# Patient Record
Sex: MALE | Race: WHITE | URBAN - METROPOLITAN AREA
[De-identification: names, ages, dates, MRNs, and addresses within clinical notes are randomized per-mention and may not be internally consistent; named-entity substitution may affect disease eponyms.]

---

## 2019-08-27 NOTE — TELEPHONE ENCOUNTER
FUTURE VISIT INFORMATION      FUTURE VISIT INFORMATION:    Date: 8/29/19    Time: 2PM    [place] [facility] [place]:    Referring provider:      Referring providers clinic:      Reason for visit/diagnosis  chronic tonsillitis     RECORDS REQUESTED FROM:       Clinic name Comments Records Status Imaging Status                                         *no records

## 2019-08-29 ENCOUNTER — PRE VISIT (OUTPATIENT)
Dept: OTOLARYNGOLOGY | Facility: CLINIC | Age: 29
End: 2019-08-29

## 2019-08-29 ENCOUNTER — OFFICE VISIT (OUTPATIENT)
Dept: OTOLARYNGOLOGY | Facility: CLINIC | Age: 29
End: 2019-08-29

## 2019-08-29 VITALS
RESPIRATION RATE: 16 BRPM | HEIGHT: 74 IN | BODY MASS INDEX: 31.06 KG/M2 | DIASTOLIC BLOOD PRESSURE: 79 MMHG | WEIGHT: 242 LBS | HEART RATE: 60 BPM | SYSTOLIC BLOOD PRESSURE: 146 MMHG

## 2019-08-29 DIAGNOSIS — J35.1 ENLARGED TONSILS: Primary | ICD-10-CM

## 2019-08-29 DIAGNOSIS — J34.2 DEVIATED NASAL SEPTUM: ICD-10-CM

## 2019-08-29 ASSESSMENT — PAIN SCALES - GENERAL: PAINLEVEL: NO PAIN (0)

## 2019-08-29 ASSESSMENT — MIFFLIN-ST. JEOR: SCORE: 2142.45

## 2019-08-29 NOTE — PROGRESS NOTES
"[facility] Ear, Nose and Throat Clinic New Patient Visit Note        Otolaryngology        August 29, 2019    Referring Provider:  Self         HPI:  Eron Hui is a 29 year old male who presents for evaluation for a tonsillectomy.  He has a history of chronic tonsillitis.  Since he was a child, he had frequent episodes of tonsillitis and strep throat.  He thinks it happens 6-7 times a year.  In the past 6 months to a year, he has had more frequent episodes.  He also developed what his doctor told him was a left-sided peritonsillar abscess.  He was treated with steroids and antibiotics.  He never had it drained and never had surgery.    When he gets these episodes, he gets severe pain in his throat, fevers, exudates, lymphadenopathy and occasionally some referred otalgia.  He had the episodes of his peritonsillar abscess, he had trouble swallowing even liquids.    All symptoms have resolved at this time, however, his left tonsil remains quite enlarged.  He reports that he has been seen by Dr. Kimberlyn Dallas at [facility] and was set up to have his tonsils removed, however, he found out that he is unfortunately not in network and it was in a cost him nearly $7000 out of pocket.  He is here today because [facility] is within his network.  He wants to get scheduled with a surgeon to have his tonsillectomy done as soon as possible.  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    He is a former smoker.  He does drink alcohol.    ROS:  10 point review of systems completed and is negative except for those listed above    PMH: None, healthy    PSH: None    FamH: Mother and Father with HTN, \"all 4 grandparents with HTN\", \"a bunch of relatives on his mother side with diabetes\"    SocH:   Smoked 2-3 cigarettes per day for 8 years, quit 200 days ago (January 2019)  No smokeless tobacco or eCigs    EtOH:  Yes, drinks 8 drinks, 3 times a " "week    Illicit drugs:  No  Medications:   None      Allergies:   Amoxicillin, Penicillin, All \"cillin family\"      Physical Exam:   BP (!) 146/79 (BP Location: Right arm, Patient Position: Sitting, Cuff Size: Adult Large)   Pulse 60   Resp 16   Ht 1.88 m (6' 2\")   Wt 109.8 kg (242 lb)   BMI 31.07 kg/m      Constitutional: The patient was unaccompanied, well-groomed, and in no acute distress.    Head: Normocephalic and atraumatic.   Eyes: Pupils were equal and reactive.  Extraocular movement intact.    Ears: Pinnae and tragus non-tender.  EACs and TMs were clear under microscopic exam.   Nose: Sinuses were non-tender.  Anterior rhinoscopy revealed rightward deviation of septum and absence of purulence or polyps.    Mouth: Mucosa pink and moist, tonsils non-erythematous, no exudates, right tonsil 1+, left tonsil 3+ and cryptic, uvula midline  Neck: No lymphadenopathy in the neck.  No palpable thyroid.  Normal range of motion  Respiratory: Breathing comfortably without stridor or exertion of accessory muscles.   Skin: Normal:  warm and pink without rash  Neurologic: Alert and oriented x 3.  CN's III-XII within normal limits.  Voice normal.   Psychiatric: The patient's affect was calm, cooperative, and appropriate.    Communication: Normal; communicates verbally, normal voice quality.        Fiberoptic Endoscopy:  Consent for fiberoptic laryngoscopy was obtained, and we confirmed correctness of procedure and identity of patient.  Fiberoptic laryngoscopy was indicated due to hx of left PTA and enlarged left tonxil.  The nose was topically decongested and anesthetized.  The fiberoptic laryngoscope was passed under endoscopic vision.  The turbinates were normal.  The inferior and middle meati were clear bilaterally without purulence, masses, or polyps.  The nasopharynx was showed some enlarged adenoid tissue.  The Eustachian tubes were clear.  The soft palate appeared normal with good mobility.  The epiglottis was " sharp and the visualized portion of the vallecula was clear. Left lingual tonsil present and excess lymphoid tissue noted along the left pharyngeal wall. The larynx was clear with mobile cords.  The arytenoids were clear and there was no pooling in the hypopharynx.        Video of endoscopy (please click on image above to watch)           Left tonsil       Adenoid       Left lingual tonsill and L pharyngeal wall        Assessment/Plan:   1. Enlarged tonsil and adenoids.  Patient with chronic history of tonsillitis, enlarged left-sided tonsil, adenoids, enlarged left lingual tonsil and lymphoid tissue along the left pharyngeal wall.    Does meet criteria for tonsillectomy.  As mentioned above in the HPI portion of this note, he  is in a time comes to get this done.  I will have him see 1 of our surgeons as soon as possible to discuss a tonsillectomy.    2.  Deviated septum.  Asymptomatic.  Patient will contact us if he becomes symptomatic or if he decides he wants it fixed.        Luciana Washingtno PA-C  Otolaryngology  Head & Neck Surgery  [phone]      CC:  Tiffany Harris MD  Otolaryngology & Sleep Surgery  [facility] [address]    Nathalie Dallas MD  [facility] ENT  [address] [place] [address]

## 2019-08-29 NOTE — TELEPHONE ENCOUNTER
FUTURE VISIT INFORMATION      FUTURE VISIT INFORMATION:    Date: 8/30/19    Time: 1:00 pm    Location: [facility] [place] [facility]  REFERRAL INFORMATION:    Referring provider:  CECE Garcia    Referring providers clinic:   [facility]    Reason for visit/diagnosis  Tonsillectomy consult    RECORDS REQUESTED FROM:       Clinic name Comments Records Status Imaging Status   [facility] ENT Office Visit-8/29/19-CECE Garcia [facility] Otolaryngology Office Visit-7/29/19-Dr. Kimberlyn Dallas Care Everywhere     [facility]  Visit-7/23/19 Care Everywhere    -[facility] Office Visit-5/23/16 Care Everywhere

## 2019-08-29 NOTE — NURSING NOTE
"Chief Complaint   Patient presents with     Consult     chronic tonsillitis      BP (!) 146/79 (BP Location: Right arm, Patient Position: Sitting, Cuff Size: Adult Large)   Pulse 60   Resp 16   Ht 1.88 m (6' 2\")   Wt 109.8 kg (242 lb)   BMI 31.07 kg/m     Shawna Alvarado CMA    "

## 2019-08-29 NOTE — PATIENT INSTRUCTIONS
Eron Jacinto,    It was a pleasure to see you today.    1. You were seen in the ENT Clinic today by Luciana Washington PA-C    If you have any questions or concerns after your appointment, please call   - Option 1: ENT Clinic: [phone]    2. Please schedule with one of our surgeons for a tonsillectomy as soon as possible.      Thank you,  Luciana Washington PA-C  Otolaryngology  Head & Neck Surgery  [phone]

## 2019-08-29 NOTE — LETTER
8/29/2019       RE: Eron Jacinto  [address]  [place] [address]     Dear Colleague,    Thank you for referring your patient, Eron Jacinto, to the [facility] EAR NOSE AND THROAT at [facility]. Please see a copy of my visit note below.    [facility] Ear, Nose and Throat Clinic New Patient Visit Note        Otolaryngology        August 29, 2019    Referring Provider:  Self         HPI:  Eron Hui is a 29 year old male who presents for evaluation for a tonsillectomy.  He has a history of chronic tonsillitis.  Since he was a child, he had frequent episodes of tonsillitis and strep throat.  He thinks it happens 6-7 times a year.  In the past 6 months to a year, he has had more frequent episodes.  He also developed what his doctor told him was a left-sided peritonsillar abscess.  He was treated with steroids and antibiotics.  He never had it drained and never had surgery.    When he gets these episodes, he gets severe pain in his throat, fevers, exudates, lymphadenopathy and occasionally some referred otalgia.  He had the episodes of his peritonsillar abscess, he had trouble swallowing even liquids.    All symptoms have resolved at this time, however, his left tonsil remains quite enlarged.  He reports that he has been seen by Dr. Kimberlyn Dallas at [facility] and was set up to have his tonsils removed, however, he found out that he is unfortunately not in network and it was in a cost him nearly $7000 out of pocket.  He is here today because [facility] is within his network.  He wants to get scheduled with a surgeon to have his tonsillectomy done as soon as possible.  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    He is a former smoker.  He does drink alcohol.    ROS:  10 point review of systems completed and is negative except for those listed above    PMH: None, healthy    PSH:  "None    FamH: Mother and Father with HTN, \"all 4 grandparents with HTN\", \"a bunch of relatives on his mother side with diabetes\"    SocH:   Smoked 2-3 cigarettes per day for 8 years, quit 200 days ago (January 2019)  No smokeless tobacco or eCigs    EtOH:  Yes, drinks 8 drinks, 3 times a week    Illicit drugs:  No  Medications:   None      Allergies:   Amoxicillin, Penicillin, All \"cillin family\"      Physical Exam:   BP (!) 146/79 (BP Location: Right arm, Patient Position: Sitting, Cuff Size: Adult Large)   Pulse 60   Resp 16   Ht 1.88 m (6' 2\")   Wt 109.8 kg (242 lb)   BMI 31.07 kg/m       Constitutional: The patient was unaccompanied, well-groomed, and in no acute distress.    Head: Normocephalic and atraumatic.   Eyes: Pupils were equal and reactive.  Extraocular movement intact.    Ears: Pinnae and tragus non-tender.  EACs and TMs were clear under microscopic exam.   Nose: Sinuses were non-tender.  Anterior rhinoscopy revealed rightward deviation of septum and absence of purulence or polyps.    Mouth: Mucosa pink and moist, tonsils non-erythematous, no exudates, right tonsil 1+, left tonsil 3+ and cryptic, uvula midline  Neck: No lymphadenopathy in the neck.  No palpable thyroid.  Normal range of motion  Respiratory: Breathing comfortably without stridor or exertion of accessory muscles.   Skin: Normal:  warm and pink without rash  Neurologic: Alert and oriented x 3.  CN's III-XII within normal limits.  Voice normal.   Psychiatric: The patient's affect was calm, cooperative, and appropriate.    Communication: Normal; communicates verbally, normal voice quality.        Fiberoptic Endoscopy:  Consent for fiberoptic laryngoscopy was obtained, and we confirmed correctness of procedure and identity of patient.  Fiberoptic laryngoscopy was indicated due to hx of left PTA and enlarged left tonxil.  The nose was topically decongested and anesthetized.  The fiberoptic laryngoscope was passed under endoscopic " vision.  The turbinates were normal.  The inferior and middle meati were clear bilaterally without purulence, masses, or polyps.  The nasopharynx was showed some enlarged adenoid tissue.  The Eustachian tubes were clear.  The soft palate appeared normal with good mobility.  The epiglottis was sharp and the visualized portion of the vallecula was clear. Left lingual tonsil present and excess lymphoid tissue noted along the left pharyngeal wall. The larynx was clear with mobile cords.  The arytenoids were clear and there was no pooling in the hypopharynx.        Video of endoscopy (please click on image above to watch)           Left tonsil       Adenoid       Left lingual tonsill and L pharyngeal wall        Assessment/Plan:   1. Enlarged tonsil and adenoids.  Patient with chronic history of tonsillitis, enlarged left-sided tonsil, adenoids, enlarged left lingual tonsil and lymphoid tissue along the left pharyngeal wall.    Does meet criteria for tonsillectomy.  As mentioned above in the HPI portion of this note, he  is in a time comes to get this done.  I will have him see 1 of our surgeons as soon as possible to discuss a tonsillectomy.    2.  Deviated septum.  Asymptomatic.  Patient will contact us if he becomes symptomatic or if he decides he wants it fixed.        Luciana Washington PA-C  Otolaryngology  Head & Neck Surgery  [phone]      CC:  Tiffany Harris MD  Otolaryngology & Sleep Surgery  [facility] 396    Nathalie Dallas MD  [facility] ENT  [address] [place] [address]

## 2019-08-30 ENCOUNTER — TELEPHONE (OUTPATIENT)
Dept: OTOLARYNGOLOGY | Facility: CLINIC | Age: 29
End: 2019-08-30

## 2019-08-30 ENCOUNTER — OFFICE VISIT (OUTPATIENT)
Dept: OTOLARYNGOLOGY | Facility: CLINIC | Age: 29
End: 2019-08-30

## 2019-08-30 ENCOUNTER — PRE VISIT (OUTPATIENT)
Dept: OTOLARYNGOLOGY | Facility: CLINIC | Age: 29
End: 2019-08-30

## 2019-08-30 VITALS
RESPIRATION RATE: 15 BRPM | WEIGHT: 241 LBS | BODY MASS INDEX: 30.93 KG/M2 | DIASTOLIC BLOOD PRESSURE: 69 MMHG | HEART RATE: 72 BPM | HEIGHT: 74 IN | SYSTOLIC BLOOD PRESSURE: 124 MMHG

## 2019-08-30 DIAGNOSIS — J35.1 ENLARGED TONSILS: Primary | ICD-10-CM

## 2019-08-30 DIAGNOSIS — J35.01 CHRONIC TONSILLITIS: ICD-10-CM

## 2019-08-30 RX ORDER — CLINDAMYCIN PHOSPHATE 900 MG/50ML
900 INJECTION, SOLUTION INTRAVENOUS SEE ADMIN INSTRUCTIONS
Status: CANCELLED | OUTPATIENT
Start: 2019-08-30

## 2019-08-30 RX ORDER — CLINDAMYCIN PHOSPHATE 900 MG/50ML
900 INJECTION, SOLUTION INTRAVENOUS
Status: CANCELLED | OUTPATIENT
Start: 2019-08-30

## 2019-08-30 ASSESSMENT — MIFFLIN-ST. JEOR: SCORE: 2137.92

## 2019-08-30 ASSESSMENT — PAIN SCALES - GENERAL: PAINLEVEL: NO PAIN (0)

## 2019-08-30 NOTE — NURSING NOTE
"Chief Complaint   Patient presents with     RECHECK     Tonsillectomy consult     /69 (BP Location: Right arm, Patient Position: Sitting, Cuff Size: Adult Large)   Pulse 72   Resp 15   Ht 1.88 m (6' 2\")   Wt 109.3 kg (241 lb)   BMI 30.94 kg/m      Shawna Alvarado CMA    "

## 2019-08-30 NOTE — TELEPHONE ENCOUNTER
Phone call to pt with 9/9 11AM surgery time.  Pt states he has preop physical scheduled.  Preop instruction provided at [facility] today.  Asked pt to call back with questions or concerns.  Ana Og RN

## 2019-08-30 NOTE — PATIENT INSTRUCTIONS
1.  You were seen in the ENT Clinic today by Dr. Harris.  If you have any questions or concerns after your appointment, please call [phone].    2.  Plan is to move forward with a Bilateral Tonsillectomy. This is an outpatient procedure that is done in the operating room. You will need a  the day of surgery.    3. You will need to consult with your primary care MD for a pre-op physical.  Forms for this were sent home with you today.    4. Didi, our surgery scheduler will call you with a date and time for the procedure. You can also reach her at [phone]     5.  Please return to the clinic 3 weeks after your surgery       Please call our clinic for any questions, concerns, and/or worsening symptoms.      Clinic #[phone]       Option 1 for scheduling.    Thank you for allowing us to be apart of your care!    Lorraine DONOVAN RNCC    If you need to reach me my direct line is: [phone]         Patient Education     Adult Tonsillectomy    The tonsils are 2 small masses of tissue at the back of the throat. They are part of the body s immune system. This helps the body fight disease. In some people, the tonsils become infected or enlarged. This can cause severe sore throats, snoring, or other problems. Tonsillectomy is surgery to remove the tonsils. Tonsillectomy may be recommended if you have obstruction causing sleep apnea, or recurring, chronic, or severe infections.   Preparing for surgery  Prepare as you have been told. Tell your healthcare provider about all medicine you take. This includes over-the-counter drugs. It also includes herbs and other supplements. You may need to stop taking some or all of them before surgery as directed by your healthcare provider. Also, follow any directions you re given for not eating or drinking before surgery.  The day of surgery  The surgery takes about 60 minutes. You will likely go home on the same day.  Before the surgery  Here is what to expect before the  surgery begins:    An IV line is put into a vein in your arm or hand. This line supplies fluids and medicines.    To keep you free of pain during the surgery, you re given general anesthesia. This medicine puts you into a state like deep sleep through the surgery.  During the surgery  Here is what to expect during the surgery:    A tube will be placed in your throat to keep your airway open.    A special device is used to keep the mouth open.    Other tools are used to remove the tonsils or part of the tonsils  from the back of the throat. The tissue is taken out through the mouth.    The device holding the mouth open and the tube are then removed.  After the surgery  You will be taken to a recovery room. Healthcare staff will make sure you can drink some liquids. They will also make sure your pain is being managed. When you are ready to leave the hospital, you will need to be driven home by an adult family member or friend.  Recovering at home  It will likely take about 2 weeks to heal from the surgery. During your recovery:    Expect to have throat pain. You may also feel pain in your ears. This is  referred  pain from the throat, and is normal. Your post-surgery pain may come and go. It may be worse on the 1st or 2nd day after surgery.    Talk as little as possible, if it is painful.    Take pain medicine as directed.    Don't drive while you are on opioid or narcotic pain medicine. Expect to feel sleepy or dizzy while you are taking this medicine.    Don't use ibuprofen or aspirin for 14 days after surgery unless your healthcare provider says it s OK. You may use acetaminophen as directed.    Use 2 or 3 pillows under your head while resting. This will help keep swelling down.    Drink plenty of chilled liquids. Water, noncitrus juices, and frozen juice bars are good choices.    Eat cold foods and soft foods, which are easiest to swallow. Try foods like ice cream, gelatin, scrambled eggs, pasta, and mashed  potatoes.    Don't eat foods that need a lot of chewing. Also avoid foods that may scratch the throat, like toast or potato chips. Don't have hot, spicy, or acidic foods.    Don't do strenuous activity or heavy lifting for 2 weeks after surgery.    Be aware that white patches will form in the throat during healing. These are scabs and are not a sign of infection. The patches will come off in  6 to 9 days and may cause a small amount of  bleeding. To minimize bleeding, drink plenty of fluids. Gargling with cold water can help.  When to call your healthcare provider  Call your healthcare provider right away if you have any of the following:    Chest pain or trouble breathing (call 911)    Fever of 100.4 F (38 C) or higher, or as directed by your healthcare provider    Bright red bleeding from the mouth or nose    Severe pain not relieved by medicine    Signs of dehydration (dark urine, urinating less often)    Heavy or persistent bleeding in the throat at any time    Other signs or symptoms as indicated by your healthcare provider   Follow-up  Schedule a follow-up visit with your healthcare provider as advised. During this visit, the healthcare provider will make sure you are healing well. Ask any questions you have about the surgery or your recovery.  Risks and possible complications    Infection    Bleeding    Lung problems    Nausea and vomiting    Injury to the lips, teeth, or jaw    Painful swallowing during recovery    Voice changes    The need for a second surgery    Risks of anesthesia    Date Last Reviewed: 6/1/2017 2000-2018 [employer]. [address] [place] [address]. All rights reserved. This information is not intended as a substitute for professional medical care. Always follow your healthcare professional's instructions.           Patient Education     Tonsillectomy, Post-Op Bleeding  You have had surgery to remove your tonsils. Bleeding at the surgery site can happen after  surgery. The healthcare provider can often control it using direct pressure or applying medicine to shrink the blood vessels. If this fails, you will need to return to the operating room for further treatment. Notify your healthcare provider at once or return to this hospital if there are signs of any further bleeding.  Home Care    Your throat will be sore. Throat pain after surgery improves over the first 3-5 days. It is normal to have more pain at the end of the first week before it finally goes away.    Soft foods will be easier to swallow.    Drink plenty of fluids to prevent dehydration. You must continue to drink even though your throat hurts.    For pain relief, suck on ice cubes or frozen fruit pops, eat ice cream or sherbet, and drink cold liquids. You may also use liquid acetaminophen. Don't take ibuprofen or aspirin. These may increase bleeding risk. If your healthcare provider has prescribed pain medicine, take this as directed.     If antibiotics were prescribed, take these as directed until they are gone.    Do not overheat your home since this dries the air and may irritate throat tissues, especially at night. A cool-mist humidifier in the bedroom may help.    Don t have contact with people with colds or the flu during the recovery period. You may be more likely to get ill during this time.    Smoking irritates the surgery site. If you smoke, this is a good time to stop.    Don't do heavy exercise, lifting, or straining for the next 10 days.  Follow-up care  Follow up with your healthcare provider or this facility as advised.  When to see medical advice  Call your healthcare provider right away if any of the following occur:    Trouble speaking, swallowing, or breathing    Nausea and vomiting    Bleeding from the mouth    Fever over 100.4 F (38.3 C)    Increasing pain not controlled by pain medicines    Signs of dehydration: Very dark urine, less urine, dry mouth, weakness  Date Last Reviewed:  10/1/2017    2017-4808 [employer]. [address] [place] [address], [place] [address]. All rights reserved. This information is not intended as a substitute for professional medical care. Always follow your healthcare professional's instructions.           Caring for Yourself after Tonsillectomy / Adenoidectomy     What to expect after surgery:     A low fever (below 101 F or 38.3 C, taken under the tongue).     A sore throat that lasts 7 to 10 days, or as long as 14 days.     Ear, jaw or neck pain. Pain may peak about a week after surgery.     Yellow or white-gray tissue where the tonsils were removed.     A white film on the tongue. This will go away within 10 to 14 days.     Bad breath for many days as the throat heals. Gentle tooth brushing is allowed. Do not have gargle.    A change in the voice. This will go away in about three weeks.     Snoring: This will usually improve over time.     Stuffy nose: This is normal.   Care after surgery:     Consume a mechanical soft diet for the first week after surgery. Progress to thicker foods as tolerated.  o Macaroni, eggs, mashed potatoes, applesauce, cooked cereal, yogurt, etc.    Avoid rough or crunchy foods for at least 7 days.     Consume plenty of fluids- at least 24 to 64 ounces per day. Cool or lukewarm liquids may feel better at first. Sports drinks are a good choice. Avoid citrus juice as this may burn.     Popsicles, smoothies, and ICEES are good options to sooth the throat.   o NO STRAWS    Chewing gum may help increase saliva and ease pain.   Things to Avoid:     Gargling     Avoid rough or crunchy foods for at least 7 days.     Straws    Heavy or strenuous activity for at least 7 days.   Activity:     You should avoid heavy or strenuous activity for one week.     Refrain from work for at least 2 weeks following your surgery. You may not return if you are still taking prescribed narcotic pain medicine.  Pain:     Pain may start to get better and then  get worse again, often peaking on days 3 to 7 after surgery. This is common.     It will hurt to swallow at first. The more you attempt to swallow, the less it will hurt.     You may take prescribed pain medicine as needed. We will tell you how much to take and how often. Expect to take pain medications for at least 7-10 days     After two days, you may replace some or all of the prescribed medicine with liquid Tylenol.     Talk to your doctor before giving ibuprofen (Motrin, Advil) or other potential blood thinning medicines within 10 days following surgery. Some medicines will increase the risk of bleeding.     A humidifier may help ease a sore throat. You might also try an ice pack on the throat for 20 minutes. (Place a cloth between the skin and the ice pack.)     Follow up:     3 weeks after surgery    When to call us:     Bleeding: if you have any bleeding, call your clinic right away.   o Some bleeding after surgery is normal. Expect blood tinged mucous.   o The risk for bleeding increases approximately 10 days after surgery when the surgery site eschar- gray patches where tonsils were removed ( like a wet scab) begins to fall off.   o If it is after business hours, go to the Emergency Room. Bleeding may occur up to 2 weeks after surgery. Most people will spit out the blood. Some will swallow the blood and then vomit.     Fever over 101 F (38.3 C), taken under the tongue, if the fever lasts more than 48 hours.     Nausea, vomiting or constipation    Breathing problems (more severe than a stuffy nose) go to the ER     Important Phone Numbers:     During office hours: [phone]     Emergency/ After hours: [phone] (ask to page the ENT resident who is on-call)   [facility]/ [facility] Emergency room, or closest emergency room for bleeding, airway concerns.     What to bring to ER- yourself, family member, insurance cards.    Please call/contact with further questions/concerns.

## 2019-08-30 NOTE — LETTER
2019       RE: Eron Jacinto  [address]  [place] [address]     Dear Colleague,    Thank you for referring your patient, Eron Jacinto, to the [facility] EAR NOSE AND THROAT at [facility]. Please see a copy of my visit note below.    Otolaryngology Adult Consultation    Patient: Eron Jacinto  : 1990        HPI:  Eron Jacinto is a 29 year old male seen today in the Otolaryngology Clinic for recurrent tonsillitis.  He has had a long history recurring tonsillitis.  He was previously seen at [facility] for the same issue.  He is not in network with them and therefore is switching his care over to the [facility].  He  he recently had a bad tonsillitis episode in which the left side of his throat and tonsil started swelling up.  Left tonsil got as twice as large as the right side.  It sounds like he had a potential abscess there.  He was placed on antibiotics and steroids which did resolve the episode eventually.  Patient is very tired of recurrent tonsillitis because it does interfere with  work and time off.  He gets this episode 6-7 times a year.  In the past 6 months to year he has had 4 more frequent episodes.  When he gets these episodes he gets severe pain, fever, exudates, lymphadenopathy and referred otalgia.  He has odynophagia.    Medications:  No current outpatient medications on file.       Allergies: Amoxicillin and Penicillins     PMH:  No past medical history on file.    PSH:  No past surgical history on file.    FH:  No family history on file.     SH:  Social History     Tobacco Use     Smoking status: Not on file   Substance Use Topics     Alcohol use: Not on file     Drug use: Not on file       Review of Systems   ENT ROS 2019   Constitutional Problems with sleep, Unexplained fever or night sweats   Ears, Nose, Throat Ear pain, Sore throat, Trouble swallowing, Coughing up blood   Hematologic Lymph node swelling    Endocrine Thirst, Heat or cold intolerance   Other Rash       Physical Exam:    GEN:  The patient is alert, oriented and in no acute distress.  HEAD:  Head, face scalp is grossly normal.  .  ORAL:  Oral cavity shows healthy mucosa with out ulceration, masses or other lesions                involving the tongue, palate, buccal mucosa, floor of mouth or gingiva.                 NECK:   Neck is without adenopathy, thyroid or salivary gland masses.      Assessment/Plan: Patient presents with a history of recurrent tonsillitis.Risks of surgery were discussed with the patient ,which include sever pain, bleeding 7-10 days after surgery (~8% risk), tongue numbness, tongue swelling, taste change, VPI, and nasopharyngeal stenosis. Expected post-operative recovery was also discussed and includes soft diet, pain control, and time off of work (7-14 days).    Patient would prefer to have surgery on Monday, September 9.  I did tell him that is not my typical operative day here at the [facility].  I do go up to [place] on Mondays.  It is possible we could see if there is OR time there.     I spent a total of 35 minutes face-to-face with Eron Jacinto during today's office visit.  Over 50% of this time was spent counseling the patient on and/or coordinating care as documented in my assessment and plan.        Again, thank you for allowing me to participate in the care of your patient.      Sincerely,    Tiffany Harris MD

## 2019-08-30 NOTE — PROGRESS NOTES
Otolaryngology Adult Consultation    Patient: Eron Jacinto  : 1990        HPI:  Eron Jacinto is a 29 year old male seen today in the Otolaryngology Clinic for recurrent tonsillitis.  He has had a long history recurring tonsillitis.  He was previously seen at health [facility] for the same issue.  He is not in network with them and therefore is switching his care over to the [facility].  He  he recently had a bad tonsillitis episode in which the left side of his throat and tonsil started swelling up.  Left tonsil got as twice as large as the right side.  It sounds like he had a potential abscess there.  He was placed on antibiotics and steroids which did resolve the episode eventually.  Patient is very tired of recurrent tonsillitis because it does interfere with  work and time off.  He gets this episode 6-7 times a year.  In the past 6 months to year he has had 4 more frequent episodes.  When he gets these episodes he gets severe pain, fever, exudates, lymphadenopathy and referred otalgia.  He has odynophagia.    Medications:  No current outpatient medications on file.       Allergies: Amoxicillin and Penicillins     PMH:  No past medical history on file.    PSH:  No past surgical history on file.    FH:  No family history on file.     SH:  Social History     Tobacco Use     Smoking status: Not on file   Substance Use Topics     Alcohol use: Not on file     Drug use: Not on file       Review of Systems   ENT ROS 2019   Constitutional Problems with sleep, Unexplained fever or night sweats   Ears, Nose, Throat Ear pain, Sore throat, Trouble swallowing, Coughing up blood   Hematologic Lymph node swelling   Endocrine Thirst, Heat or cold intolerance   Other Rash       Physical Exam:    GEN:  The patient is alert, oriented and in no acute distress.  HEAD:  Head, face scalp is grossly normal.  .  ORAL:  Oral cavity shows healthy mucosa with out ulceration, masses or other lesions                 involving the tongue, palate, buccal mucosa, floor of mouth or gingiva.                 NECK:   Neck is without adenopathy, thyroid or salivary gland masses.      Assessment/Plan: Patient presents with a history of recurrent tonsillitis.Risks of surgery were discussed with the patient ,which include sever pain, bleeding 7-10 days after surgery (~8% risk), tongue numbness, tongue swelling, taste change, VPI, and nasopharyngeal stenosis. Expected post-operative recovery was also discussed and includes soft diet, pain control, and time off of work (7-14 days).    Patient would prefer to have surgery on Monday, September 9.  I did tell him that is not my typical operative day here at the [facility].  I do go up to [place] on Mondays.  It is possible we could see if there is OR time there.     I spent a total of 35 minutes face-to-face with Eron Jacinto during today's office visit.  Over 50% of this time was spent counseling the patient on and/or coordinating care as documented in my assessment and plan.

## 2019-09-06 ENCOUNTER — ANESTHESIA EVENT (OUTPATIENT)
Dept: SURGERY | Facility: AMBULATORY SURGERY CENTER | Age: 29
End: 2019-09-06

## 2019-09-09 ENCOUNTER — ANESTHESIA (OUTPATIENT)
Dept: SURGERY | Facility: AMBULATORY SURGERY CENTER | Age: 29
End: 2019-09-09
Payer: COMMERCIAL

## 2019-09-09 ENCOUNTER — HOSPITAL ENCOUNTER (OUTPATIENT)
Facility: AMBULATORY SURGERY CENTER | Age: 29
Discharge: HOME | End: 2019-09-09
Attending: OTOLARYNGOLOGY | Admitting: OTOLARYNGOLOGY
Payer: COMMERCIAL

## 2019-09-09 VITALS
TEMPERATURE: 97 F | SYSTOLIC BLOOD PRESSURE: 131 MMHG | HEART RATE: 56 BPM | RESPIRATION RATE: 14 BRPM | DIASTOLIC BLOOD PRESSURE: 72 MMHG | OXYGEN SATURATION: 100 %

## 2019-09-09 DIAGNOSIS — J35.01 CHRONIC TONSILLITIS: ICD-10-CM

## 2019-09-09 DIAGNOSIS — G89.18 ACUTE POST-OPERATIVE PAIN: Primary | ICD-10-CM

## 2019-09-09 PROCEDURE — 42826 REMOVAL OF TONSILS: CPT | Performed by: OTOLARYNGOLOGY

## 2019-09-09 PROCEDURE — G8907 PT DOC NO EVENTS ON DISCHARG: HCPCS

## 2019-09-09 PROCEDURE — G8916 PT W IV AB GIVEN ON TIME: HCPCS

## 2019-09-09 PROCEDURE — 42826 REMOVAL OF TONSILS: CPT

## 2019-09-09 PROCEDURE — 88304 TISSUE EXAM BY PATHOLOGIST: CPT | Performed by: OTOLARYNGOLOGY

## 2019-09-09 RX ORDER — LIDOCAINE HYDROCHLORIDE 20 MG/ML
INJECTION, SOLUTION INFILTRATION; PERINEURAL PRN
Status: DISCONTINUED | OUTPATIENT
Start: 2019-09-09 | End: 2019-09-09

## 2019-09-09 RX ORDER — CLINDAMYCIN PHOSPHATE 900 MG/50ML
900 INJECTION, SOLUTION INTRAVENOUS SEE ADMIN INSTRUCTIONS
Status: DISCONTINUED | OUTPATIENT
Start: 2019-09-09 | End: 2019-09-10 | Stop reason: HOSPADM

## 2019-09-09 RX ORDER — GABAPENTIN 300 MG/1
300 CAPSULE ORAL ONCE
Status: COMPLETED | OUTPATIENT
Start: 2019-09-09 | End: 2019-09-09

## 2019-09-09 RX ORDER — HYDROCODONE BITARTRATE AND ACETAMINOPHEN 7.5; 325 MG/15ML; MG/15ML
10 SOLUTION ORAL
Status: DISCONTINUED | OUTPATIENT
Start: 2019-09-09 | End: 2019-09-10 | Stop reason: HOSPADM

## 2019-09-09 RX ORDER — FENTANYL CITRATE 50 UG/ML
25-50 INJECTION, SOLUTION INTRAMUSCULAR; INTRAVENOUS
Status: DISCONTINUED | OUTPATIENT
Start: 2019-09-09 | End: 2019-09-10 | Stop reason: HOSPADM

## 2019-09-09 RX ORDER — LIDOCAINE HYDROCHLORIDE AND EPINEPHRINE 10; 10 MG/ML; UG/ML
INJECTION, SOLUTION INFILTRATION; PERINEURAL PRN
Status: DISCONTINUED | OUTPATIENT
Start: 2019-09-09 | End: 2019-09-09 | Stop reason: HOSPADM

## 2019-09-09 RX ORDER — DEXAMETHASONE SODIUM PHOSPHATE 4 MG/ML
4 INJECTION, SOLUTION INTRA-ARTICULAR; INTRALESIONAL; INTRAMUSCULAR; INTRAVENOUS; SOFT TISSUE EVERY 10 MIN PRN
Status: DISCONTINUED | OUTPATIENT
Start: 2019-09-09 | End: 2019-09-10 | Stop reason: HOSPADM

## 2019-09-09 RX ORDER — ONDANSETRON 2 MG/ML
INJECTION INTRAMUSCULAR; INTRAVENOUS PRN
Status: DISCONTINUED | OUTPATIENT
Start: 2019-09-09 | End: 2019-09-09

## 2019-09-09 RX ORDER — ONDANSETRON 2 MG/ML
4 INJECTION INTRAMUSCULAR; INTRAVENOUS EVERY 30 MIN PRN
Status: DISCONTINUED | OUTPATIENT
Start: 2019-09-09 | End: 2019-09-10 | Stop reason: HOSPADM

## 2019-09-09 RX ORDER — PREDNISONE 20 MG/1
20 TABLET ORAL DAILY
Qty: 5 TABLET | Refills: 0 | Status: SHIPPED | OUTPATIENT
Start: 2019-09-09

## 2019-09-09 RX ORDER — DEXAMETHASONE SODIUM PHOSPHATE 4 MG/ML
INJECTION, SOLUTION INTRA-ARTICULAR; INTRALESIONAL; INTRAMUSCULAR; INTRAVENOUS; SOFT TISSUE PRN
Status: DISCONTINUED | OUTPATIENT
Start: 2019-09-09 | End: 2019-09-09

## 2019-09-09 RX ORDER — SODIUM CHLORIDE, SODIUM LACTATE, POTASSIUM CHLORIDE, CALCIUM CHLORIDE 600; 310; 30; 20 MG/100ML; MG/100ML; MG/100ML; MG/100ML
INJECTION, SOLUTION INTRAVENOUS CONTINUOUS
Status: DISCONTINUED | OUTPATIENT
Start: 2019-09-09 | End: 2019-09-10 | Stop reason: HOSPADM

## 2019-09-09 RX ORDER — NALOXONE HYDROCHLORIDE 0.4 MG/ML
.1-.4 INJECTION, SOLUTION INTRAMUSCULAR; INTRAVENOUS; SUBCUTANEOUS
Status: DISCONTINUED | OUTPATIENT
Start: 2019-09-09 | End: 2019-09-10 | Stop reason: HOSPADM

## 2019-09-09 RX ORDER — MEPERIDINE HYDROCHLORIDE 25 MG/ML
12.5 INJECTION INTRAMUSCULAR; INTRAVENOUS; SUBCUTANEOUS
Status: DISCONTINUED | OUTPATIENT
Start: 2019-09-09 | End: 2019-09-10 | Stop reason: HOSPADM

## 2019-09-09 RX ORDER — ACETAMINOPHEN 325 MG/1
975 TABLET ORAL ONCE
Status: COMPLETED | OUTPATIENT
Start: 2019-09-09 | End: 2019-09-09

## 2019-09-09 RX ORDER — CLINDAMYCIN PHOSPHATE 900 MG/50ML
900 INJECTION, SOLUTION INTRAVENOUS
Status: COMPLETED | OUTPATIENT
Start: 2019-09-09 | End: 2019-09-09

## 2019-09-09 RX ORDER — ONDANSETRON 4 MG/1
4 TABLET, ORALLY DISINTEGRATING ORAL EVERY 30 MIN PRN
Status: DISCONTINUED | OUTPATIENT
Start: 2019-09-09 | End: 2019-09-10 | Stop reason: HOSPADM

## 2019-09-09 RX ORDER — ONDANSETRON 4 MG/1
4-8 TABLET, ORALLY DISINTEGRATING ORAL EVERY 8 HOURS PRN
Qty: 4 TABLET | Refills: 0 | Status: SHIPPED | OUTPATIENT
Start: 2019-09-09

## 2019-09-09 RX ORDER — LIDOCAINE 40 MG/G
CREAM TOPICAL
Status: DISCONTINUED | OUTPATIENT
Start: 2019-09-09 | End: 2019-09-10 | Stop reason: HOSPADM

## 2019-09-09 RX ORDER — OXYCODONE HYDROCHLORIDE 5 MG/1
5-10 TABLET ORAL EVERY 4 HOURS PRN
Status: DISCONTINUED | OUTPATIENT
Start: 2019-09-09 | End: 2019-09-10 | Stop reason: HOSPADM

## 2019-09-09 RX ORDER — AMOXICILLIN 250 MG
1-2 CAPSULE ORAL 2 TIMES DAILY
Qty: 30 TABLET | Refills: 0 | Status: SHIPPED | OUTPATIENT
Start: 2019-09-09

## 2019-09-09 RX ORDER — FENTANYL CITRATE 50 UG/ML
INJECTION, SOLUTION INTRAMUSCULAR; INTRAVENOUS PRN
Status: DISCONTINUED | OUTPATIENT
Start: 2019-09-09 | End: 2019-09-09

## 2019-09-09 RX ORDER — HYDROMORPHONE HYDROCHLORIDE 1 MG/ML
.3-.5 INJECTION, SOLUTION INTRAMUSCULAR; INTRAVENOUS; SUBCUTANEOUS EVERY 10 MIN PRN
Status: DISCONTINUED | OUTPATIENT
Start: 2019-09-09 | End: 2019-09-10 | Stop reason: HOSPADM

## 2019-09-09 RX ORDER — PROPOFOL 10 MG/ML
INJECTION, EMULSION INTRAVENOUS PRN
Status: DISCONTINUED | OUTPATIENT
Start: 2019-09-09 | End: 2019-09-09

## 2019-09-09 RX ORDER — ALBUTEROL SULFATE 0.83 MG/ML
2.5 SOLUTION RESPIRATORY (INHALATION) EVERY 4 HOURS PRN
Status: DISCONTINUED | OUTPATIENT
Start: 2019-09-09 | End: 2019-09-10 | Stop reason: HOSPADM

## 2019-09-09 RX ORDER — KETOROLAC TROMETHAMINE 30 MG/ML
30 INJECTION, SOLUTION INTRAMUSCULAR; INTRAVENOUS EVERY 6 HOURS PRN
Status: DISCONTINUED | OUTPATIENT
Start: 2019-09-09 | End: 2019-09-10 | Stop reason: HOSPADM

## 2019-09-09 RX ORDER — OXYCODONE HCL 5 MG/5 ML
5 SOLUTION, ORAL ORAL EVERY 4 HOURS PRN
Qty: 210 ML | Refills: 0 | Status: SHIPPED | OUTPATIENT
Start: 2019-09-09 | End: 2019-09-11

## 2019-09-09 RX ORDER — GLYCOPYRROLATE 0.2 MG/ML
INJECTION, SOLUTION INTRAMUSCULAR; INTRAVENOUS PRN
Status: DISCONTINUED | OUTPATIENT
Start: 2019-09-09 | End: 2019-09-09

## 2019-09-09 RX ADMIN — SODIUM CHLORIDE, SODIUM LACTATE, POTASSIUM CHLORIDE, CALCIUM CHLORIDE: 600; 310; 30; 20 INJECTION, SOLUTION INTRAVENOUS at 10:35

## 2019-09-09 RX ADMIN — GLYCOPYRROLATE 0.2 MG: 0.2 INJECTION, SOLUTION INTRAMUSCULAR; INTRAVENOUS at 11:01

## 2019-09-09 RX ADMIN — FENTANYL CITRATE 25 MCG: 50 INJECTION, SOLUTION INTRAMUSCULAR; INTRAVENOUS at 12:10

## 2019-09-09 RX ADMIN — FENTANYL CITRATE 50 MCG: 50 INJECTION, SOLUTION INTRAMUSCULAR; INTRAVENOUS at 11:04

## 2019-09-09 RX ADMIN — LIDOCAINE HYDROCHLORIDE 5 ML: 20 INJECTION, SOLUTION INFILTRATION; PERINEURAL at 11:07

## 2019-09-09 RX ADMIN — Medication 30 MG: at 11:08

## 2019-09-09 RX ADMIN — DEXAMETHASONE SODIUM PHOSPHATE 4 MG: 4 INJECTION, SOLUTION INTRA-ARTICULAR; INTRALESIONAL; INTRAMUSCULAR; INTRAVENOUS; SOFT TISSUE at 11:15

## 2019-09-09 RX ADMIN — FENTANYL CITRATE 25 MCG: 50 INJECTION, SOLUTION INTRAMUSCULAR; INTRAVENOUS at 12:07

## 2019-09-09 RX ADMIN — FENTANYL CITRATE 50 MCG: 50 INJECTION, SOLUTION INTRAMUSCULAR; INTRAVENOUS at 11:07

## 2019-09-09 RX ADMIN — ONDANSETRON 4 MG: 2 INJECTION INTRAMUSCULAR; INTRAVENOUS at 11:15

## 2019-09-09 RX ADMIN — OXYCODONE HYDROCHLORIDE 5 MG: 5 TABLET ORAL at 12:16

## 2019-09-09 RX ADMIN — ACETAMINOPHEN 975 MG: 325 TABLET ORAL at 10:24

## 2019-09-09 RX ADMIN — FENTANYL CITRATE 25 MCG: 50 INJECTION, SOLUTION INTRAMUSCULAR; INTRAVENOUS at 12:20

## 2019-09-09 RX ADMIN — CLINDAMYCIN PHOSPHATE 900 MG: 900 INJECTION, SOLUTION INTRAVENOUS at 11:01

## 2019-09-09 RX ADMIN — GABAPENTIN 300 MG: 300 CAPSULE ORAL at 10:24

## 2019-09-09 RX ADMIN — PROPOFOL 270 MG: 10 INJECTION, EMULSION INTRAVENOUS at 11:07

## 2019-09-09 RX ADMIN — SODIUM CHLORIDE, SODIUM LACTATE, POTASSIUM CHLORIDE, CALCIUM CHLORIDE: 600; 310; 30; 20 INJECTION, SOLUTION INTRAVENOUS at 11:01

## 2019-09-09 NOTE — ANESTHESIA PREPROCEDURE EVALUATION
Anesthesia Pre-Procedure Evaluation    Patient: Eron Jacinto   MRN:     [id number] Gender:   male   Age:    29 year old :      1990        Preoperative Diagnosis: Chronic tonsillitis   Procedure(s):  TONSILLECTOMY     No past medical history on file.   No past surgical history on file.       Anesthesia Evaluation     .             ROS/MED HX    ENT/Pulmonary: Comment: Chronic Tonsillitis and recurrent strep presenting for tonsillectomy      Neurologic:  - neg neurologic ROS     Cardiovascular:  - neg cardiovascular ROS       METS/Exercise Tolerance:  >4 METS   Hematologic:  - neg hematologic  ROS       Musculoskeletal:  - neg musculoskeletal ROS       GI/Hepatic:  - neg GI/hepatic ROS       Renal/Genitourinary:  - ROS Renal section negative       Endo:  - neg endo ROS       Psychiatric:  - neg psychiatric ROS       Infectious Disease:  - neg infectious disease ROS       Malignancy:         Other:                         PHYSICAL EXAM:   Mental Status/Neuro: A/A/O   Airway: Facies: Feasible  Mallampati: I  Mouth/Opening: Full  TM distance: > 6 cm  Neck ROM: Full   Respiratory: Auscultation: CTAB     Resp. Rate: Normal     Resp. Effort: Normal      CV: Rhythm: Regular  Rate: Age appropriate  Heart: Normal Sounds  Edema: None   Comments:      Dental: Normal Dentition                LABS:  CBC: No results found for: WBC, HGB, HCT, PLT  BMP: No results found for: NA, POTASSIUM, CHLORIDE, CO2, BUN, CR, GLC  COAGS: No results found for: PTT, INR, FIBR  POC: No results found for: BGM, HCG, HCGS  OTHER: No results found for: PH, LACT, A1C, SUDHA, PHOS, MAG, ALBUMIN, PROTTOTAL, ALT, AST, GGT, ALKPHOS, BILITOTAL, BILIDIRECT, LIPASE, AMYLASE, MARK, TSH, T4, T3, CRP, SED     Preop Vitals    BP Readings from Last 3 Encounters:   19 124/69   19 (!) 146/79    Pulse Readings from Last 3 Encounters:   19 72   19 60      Resp Readings from Last 3 Encounters:   19 15   19 16    SpO2  "Readings from Last 3 Encounters:   No data found for SpO2      Temp Readings from Last 1 Encounters:   No data found for Temp    Ht Readings from Last 1 Encounters:   08/30/19 1.88 m (6' 2\")      Wt Readings from Last 1 Encounters:   08/30/19 109.3 kg (241 lb)    Estimated body mass index is 30.94 kg/m  as calculated from the following:    Height as of 8/30/19: 1.88 m (6' 2\").    Weight as of 8/30/19: 109.3 kg (241 lb).     LDA:        Assessment:   ASA SCORE: 1    H&P: History and physical reviewed and following examination; no interval change.         Plan:   Anes. Type:  General   Pre-Medication: None   Induction:  IV (Standard)   Airway: ETT; Oral; PRABHJOT   Access/Monitoring: PIV   Maintenance: Balanced     Postop Plan:   Postop Pain: Opioids  Postop Sedation/Airway: Not planned  Disposition: Outpatient     PONV Management:   Adult Risk Factors:, Postop Opioids   Prevention: Ondansetron, Dexamethasone     CONSENT: Direct conversation   Plan and risks discussed with: Patient   Blood Products: Consented (ALL Blood Products)                   Mamadou Ray MD  "

## 2019-09-09 NOTE — DISCHARGE INSTRUCTIONS
[facility]  Same-Day Surgery   Adult Discharge Orders & Instructions   For 24 hours after surgery  1. Get plenty of rest.  A responsible adult must stay with you for at least 24 hours after you leave the hospital.   2. Do not drive or use heavy equipment.  If you have weakness or tingling, don't drive or use heavy equipment until this feeling goes away.  3. Do not drink alcohol.  4. Avoid strenuous or risky activities.  Ask for help when climbing stairs.   5. You may feel lightheaded.  IF so, sit for a few minutes before standing.  Have someone help you get up.   6. If you have nausea (feel sick to your stomach): Drink only clear liquids such as apple juice, ginger ale, broth or 7-Up.  Rest may also help.  Be sure to drink enough fluids.  Move to a regular diet as you feel able.  7. You may have a slight fever. Call the doctor if your fever is over 100 F (37.7 C) (taken under the tongue) or lasts longer than 24 hours.  8. You may have a dry mouth, a sore throat, muscle aches or trouble sleeping.  These should go away after 24 hours.  9. Do not make important or legal decisions.   Call your doctor for any of the followin.  Signs of infection (fever, growing tenderness at the surgery site, a large amount of drainage or bleeding, severe pain, foul-smelling drainage, redness, swelling).    2. It has been over 8 to 10 hours since surgery and you are still not able to urinate (pass water).    3.  Headache for over 24 hours.    Tonsillectomy and Adenoidectomy    What is a tonsillectomy and adenoidectomy?    Tonsillectomy is removal of the tonsils. Adenoidectomy is removal of the adenoids. Tonsils and adenoids are lumps of tissue at the back of the throat. The tonsils and adenoids fight infection. Your child may need the tonsillectomy if he has many bad colds, sore throats, or ear infections. Tonsillectomy and adenoidectomy (T&A) are often done together.    What can I expect after  Surgery?    It is common to have an upset stomach and vomiting during the first 24 hours after surgery.    Your child s throat may be sore for two weeks, especially when eating. The soreness may get better after a few days and then get worse again. Your child s voice may change a little after surgery.    Ear pain is common, often when swallowing, because the ear and throat have a common sensory nerve. Jaw spasms, or uncontrollable movement of the jaw, may also occur and cause pain.    Neck soreness is common after an adenoidectomy and usually last about one week.    Your child will have bad breath for a few weeks because your child s throat is swollen, snoring is common after surgery but should go away after about two weeks.    How should I care for my child?    Encourage your child to drink plenty of liquids (at least 2 -3 ounces per hour)  keeping the throat moist decreases discomfort and prevents dehydration( a  dangerous condition in which the body gets dried out.)    Give pain medication regularly within the limits directed by your doctor. Give  it before bed and first thing after waking in the morning. Try to give the   pain medication 30 minutes before meals to help make swallowing easier.    To prevent bleeding, avoid coughing, nose-blowing, clearing throat, and   spitting. Wipe nose gently if needed. When sneezing, encourage your child to   Open the mouth and make a sound, to prevent pressure buildup.    Avoid coming in contact with people who have colds, flu, or infections.      What can my child eat?    The day of surgery, give only cool, Clear liquids such as:    ? Apple Juice  ? Jell-o*  ? Sebastian-aid*  ? Popsicles*  ? Flat pop (remove bubbles)  ? Water    If your child has an upset stomach, give small amounts often. Note: If   Your child vomits after drinking red liquids the vomit will be the same  color.    After the first day, when your child wants them add dairy and soft foods such as:  ? Ice  cream  ? Milk shakes(use spoon)  ? Pudding  ? Smooth yogurt  Liquids are more important than food.    Be sure your child is drinking a lot.    When your child wants them, add soft foods (foods without rough  edges). See the chart for ideas. If a food is not on the list ask yourself:    Is it easy to chew? Is it free of coarse, rough, or crispy edges?  If the answer  is yes, your child can probably eat it.    Be sure to cut foods very small and encourage your child to chew them  well. Continue the soft diet for 2 weeks after surgery Avoid citrus fruits and juices   such as orange juice and lemonade, as they may sting your child s throat. Avoid  foods that are hot in temperature or spicy hot.                               May Eat Should not eat   - Soft bread  - Soggy waffles or   German toast (no crusts).  Soaked in syrup  - Pancakes  - Scrambled or   poached egg   - Toast  - Crispy waffles  - Fried foods   - Oatmeal,or   Creamy cereal  - Soggy cold cereal  (soaked in milk   - Crunchy cold   cereal   - Soup  - Hot dogs  - Hamburgers  - Tender, moist  meat  - Pasta, noodles  - Spaghetti-Os  - Macaroni and  Cheese   - Tough, dry meat,  chicken or fish   - Milk  - Custard, pudding  - Ice cream  - Malts, shakes  - Yogurt (smooth)  - Cottage cheese   - Cookies  - Crackers  - Pretzels  - Chips  - Popcorn  - Nuts   - Sandwiches, (no crusts)  - Smooth peanut butter   and jelly  - Processed cheese  - Tuna - Grilled cheese  sandwiches   - Cooked vegetables  - Mashed potatoes - Raw vegetables   - Tomatoes   - Applesauce  - Bananas  - Canned fruits  - Watermelon with out  seeds - Citrus fruits  - Moist fresh fruits   - Juices (not citrus)  - Sebastian aid  - Flat pop (no bubbles)  - Jell-O - Citrus juices  - Pop with bubbles

## 2019-09-09 NOTE — OP NOTE
Pre-operative diagnosis: Chronic tonsillitis    Post-operative procedure: same    Procedure: bilateral tonsillectomy    Surgeons: Tiffany Harris MD    Assistants: none    Anesthesia: general    EBL: 5 ml    Specimens: right and left tonsils    Complications: none    Indications: 29 year old male with history of chronic tonsillitis and recurrent tonsillitis.    Findings: 3+ tonsils bilaterally.  NO adenoid tissue     Description: Patient was brought into the operating room and placed on the operating table.  A member of the department of anesthesia was present and intubated the patient without difficult.  A time-out was taken to identify the procedure and patient. The table was turned 90 degrees and a shoulder roll was placed.  A McIvor mouth retractor was placed and used to expose the oropharynx.  5 ml of 1.0% lidocaine with epi was injected into the soft palate.  The left tonsil was grasped with an allis clamp and medialized.  An incision was made along the anterior tonsillar pillar and the tonsillar capsule was identified.  The tonsil was dissected away from the underlying musculature using bovie cautery.  The left tonsil was passed off the field.  The right tonsil was removed in a similar fashion.      Patient did not have any significant adenoid tissue.      Patient tolerated the procedure well and was transferred to the PACU in good condition.

## 2019-09-09 NOTE — ANESTHESIA POSTPROCEDURE EVALUATION
Anesthesia POST Procedure Evaluation    Patient: Eron Jacinto   MRN:     [id number] Gender:   male   Age:    29 year old :      1990        Preoperative Diagnosis: Chronic tonsillitis   Procedure(s):  Bilateral Tonsillectomy   Postop Comments: No value filed.       Anesthesia Type:  Not documented  General    Reportable Event: NO     PAIN: Uncomplicated   Sign Out status: Comfortable, Well controlled pain     PONV: No PONV   Sign Out status:  No Nausea or Vomiting     Neuro/Psych: Uneventful perioperative course   Sign Out Status: Preoperative baseline; Age appropriate mentation     Airway/Resp.: Uneventful perioperative course   Sign Out Status: Non labored breathing, age appropriate RR; Resp. Status within EXPECTED Parameters     CV: Uneventful perioperative course   Sign Out status: Appropriate BP and perfusion indices; Appropriate HR/Rhythm     Disposition:   Sign Out in:  PACU  Disposition:  Phase II; Home  Recovery Course: Uneventful  Follow-Up: Not required     Comments/Narrative:  Patient doing well post-operatively.  No significant issues.  Hemodynamically stable, pain well controlled, nausea well controlled.  Stable for discharge from the PACU             Last Anesthesia Record Vitals:  CRNA VITALS  2019 1117 - 2019 1217      2019             Pulse:  90    SpO2:  100 %    Resp Rate (observed):  16    EKG:  NSR          Last PACU Vitals:  Vitals Value Taken Time   /76 2019 12:20 PM   Temp 36.1  C (96.9  F) 2019 11:49 AM   Pulse 53 2019 12:20 PM   Resp 16 2019 12:20 PM   SpO2 98 % 2019 12:24 PM   Temp src     NIBP     Pulse     SpO2     Resp     Temp     Ht Rate     Temp 2     Vitals shown include unvalidated device data.      Electronically Signed By: Mamadou Ray MD, 2019, 12:40 PM

## 2019-09-09 NOTE — ANESTHESIA CARE TRANSFER NOTE
Patient: Eron Jacinto    Procedure(s):  Bilateral Tonsillectomy    Diagnosis: Chronic tonsillitis  Diagnosis Additional Information: No value filed.    Anesthesia Type:   General     Note:  Airway :Room Air  Patient transferred to:PACU  Comments: Extubated awake with strong grasp and head lift.  Exchanging air well with O2 to PACU  Report given.Handoff Report: Identifed the Patient, Identified the Reponsible Provider, Reviewed the pertinent medical history, Discussed the surgical course, Reviewed Intra-OP anesthesia mangement and issues during anesthesia, Set expectations for post-procedure period and Allowed opportunity for questions and acknowledgement of understanding      Vitals: (Last set prior to Anesthesia Care Transfer)    CR[facility] VITALS  9/9/2019 1117 - 9/9/2019 1155      9/9/2019             Pulse:  90    SpO2:  100 %    Resp Rate (observed):  16    EKG:  NSR                Electronically Signed By: TIAN SCHAEFFER CRNA  September 9, 2019  11:55 AM

## 2019-09-11 DIAGNOSIS — G89.18 ACUTE POST-OPERATIVE PAIN: ICD-10-CM

## 2019-09-11 RX ORDER — OXYCODONE HCL 5 MG/5 ML
5 SOLUTION, ORAL ORAL ONCE
Status: ACTIVE | OUTPATIENT
Start: 2019-09-09

## 2019-09-11 RX ORDER — OXYCODONE HCL 5 MG/5 ML
5 SOLUTION, ORAL ORAL EVERY 4 HOURS PRN
Qty: 210 ML | Refills: 0 | Status: SHIPPED | OUTPATIENT
Start: 2019-09-11 | End: 2019-09-12

## 2019-09-12 DIAGNOSIS — G89.18 ACUTE POST-OPERATIVE PAIN: ICD-10-CM

## 2019-09-12 LAB — COPATH REPORT: NORMAL

## 2019-09-12 RX ORDER — OXYCODONE HCL 5 MG/5 ML
5-10 SOLUTION, ORAL ORAL EVERY 6 HOURS PRN
Qty: 300 ML | Refills: 0 | Status: SHIPPED | OUTPATIENT
Start: 2019-09-12

## 2019-09-18 ENCOUNTER — TELEPHONE (OUTPATIENT)
Dept: OTOLARYNGOLOGY | Facility: CLINIC | Age: 29
End: 2019-09-18

## 2019-09-18 NOTE — TELEPHONE ENCOUNTER
[facility] Call Center    Phone Message    May a detailed message be left on voicemail: yes    Reason for Call: Medication Refill Request    Has the patient contacted the pharmacy for the refill? no  Name of medication being requested: Oxycodone solution 5mg   Provider who prescribed the medication: Dr. Harris    Pharmacy: [facility]/[address]  Date medication is needed: asap  Patient called stating he has one day left of his pain medication and will not have enough to get him through the weekend. Please call to confirm script has been sent or contact with any questions. Thank you.          Action Taken: Message routed to:  [facility] ([facility] ENT

## 2019-09-18 NOTE — TELEPHONE ENCOUNTER
Advised patient  that this far out from  surgery Doctor Kimberly is no longer able to prescribe oxycodone or other pain killers but reccommends that patient transition to tylenol -alternating between tylenol and  Patient's remaining supply of oxycodone. Patient expressed understanding but stated that his tongue is swollen and now requests prednisone to reduce the swelling in his tongue.  Steven De La Cruz LPN

## 2019-09-19 DIAGNOSIS — G89.18 ACUTE POST-OPERATIVE PAIN: Primary | ICD-10-CM

## 2019-09-19 RX ORDER — PREDNISONE 20 MG/1
20 TABLET ORAL DAILY
Qty: 4 TABLET | Refills: 0 | Status: SHIPPED | OUTPATIENT
Start: 2019-09-19

## 2019-09-19 NOTE — TELEPHONE ENCOUNTER
I called the pt to let him know that Dr. Harris had sent in some prednisone for the pt to [facility] at [facility] in [facility].    Shawna Alvarado, CMA

## 2019-10-03 ENCOUNTER — OFFICE VISIT (OUTPATIENT)
Dept: OTOLARYNGOLOGY | Facility: CLINIC | Age: 29
End: 2019-10-03

## 2019-10-03 VITALS — WEIGHT: 241 LBS | BODY MASS INDEX: 30.93 KG/M2 | HEIGHT: 74 IN

## 2019-10-03 DIAGNOSIS — Z90.89 S/P TONSILLECTOMY: Primary | ICD-10-CM

## 2019-10-03 PROBLEM — J03.91 RECURRENT TONSILLITIS: Status: ACTIVE | Noted: 2019-07-29

## 2019-10-03 ASSESSMENT — MIFFLIN-ST. JEOR: SCORE: 2137.92

## 2019-10-03 NOTE — LETTER
Date:October 4, 2019      Patient was self referred, no letter generated. Do not send.        [facility] Information

## 2019-10-03 NOTE — LETTER
10/3/2019       RE: Eron Jacinto  [address]  [place] [address]     Dear Colleague,    Thank you for referring your patient, Eron Jacinto, to the [facility] EAR NOSE AND THROAT at [facility]. Please see a copy of my visit note below.    CC: s/p tonsillectomy on 9/9    HPI: Patient returns to clinic today for his first postoperative visit after tonsillectomy.  Surgery was on September 9, 2019.  Patient reports he did have pretty significant sore throat after surgery but he felt that ibuprofen was the most helpful.  He did take the oxycodone which also help but he felt like it made him very loopy so he wanted to minimize his use of that.  Steroids are also very helpful he reports.  He did have one episode of spitting up blood.  He chewed on some ice and he never had another episode of bleeding after that.  He does feel like the right side of his throat is a little bit more sore than the left side.    PE:  GEN: nad  O/C: Tonsillar fossa is are healing in nicely.  He does have a little bit more swelling on the right side compared to the left side.  Palate is symmetric.    A/P:  patient is healing as expected.  I reassured him that both sides are healing well but there is typically one side that he is a little slower which his case would be the right side.  I would expect for this to be fully healed in about 7 to 10 days.  At this time he is free to resume all normal activity as well as diet.  We discussed that that is not unusual to have type sensation in his throat for another week or 2.  He may follow-up with us as needed.      Again, thank you for allowing me to participate in the care of your patient.      Sincerely,    Tiffany Harris MD

## 2019-10-03 NOTE — PROGRESS NOTES
CC: s/p tonsillectomy on 9/9    HPI: Patient returns to clinic today for his first postoperative visit after tonsillectomy.  Surgery was on September 9, 2019.  Patient reports he did have pretty significant sore throat after surgery but he felt that ibuprofen was the most helpful.  He did take the oxycodone which also help but he felt like it made him very loopy so he wanted to minimize his use of that.  Steroids are also very helpful he reports.  He did have one episode of spitting up blood.  He chewed on some ice and he never had another episode of bleeding after that.  He does feel like the right side of his throat is a little bit more sore than the left side.    PE:  GEN: nad  O/C: Tonsillar fossa is are healing in nicely.  He does have a little bit more swelling on the right side compared to the left side.  Palate is symmetric.    A/P:  patient is healing as expected.  I reassured him that both sides are healing well but there is typically one side that he is a little slower which his case would be the right side.  I would expect for this to be fully healed in about 7 to 10 days.  At this time he is free to resume all normal activity as well as diet.  We discussed that that is not unusual to have type sensation in his throat for another week or 2.  He may follow-up with us as needed.

## 2021-06-25 ENCOUNTER — OFFICE VISIT - HEALTHEAST (OUTPATIENT)
Dept: FAMILY MEDICINE | Facility: CLINIC | Age: 31
End: 2021-06-25

## 2021-06-25 DIAGNOSIS — H65.192 OTHER NON-RECURRENT ACUTE NONSUPPURATIVE OTITIS MEDIA OF LEFT EAR: ICD-10-CM

## 2021-06-25 DIAGNOSIS — J02.9 SORE THROAT: ICD-10-CM

## 2021-06-25 LAB
DEPRECATED S PYO AG THROAT QL EIA: NORMAL
GROUP A STREP BY PCR: NORMAL

## 2021-06-29 ENCOUNTER — OFFICE VISIT (OUTPATIENT)
Dept: URGENT CARE | Facility: URGENT CARE | Age: 31
End: 2021-06-29
Payer: COMMERCIAL

## 2021-06-29 VITALS
OXYGEN SATURATION: 98 % | SYSTOLIC BLOOD PRESSURE: 120 MMHG | RESPIRATION RATE: 16 BRPM | HEART RATE: 87 BPM | WEIGHT: 270 LBS | BODY MASS INDEX: 34.65 KG/M2 | DIASTOLIC BLOOD PRESSURE: 70 MMHG | HEIGHT: 74 IN | TEMPERATURE: 98.7 F

## 2021-06-29 DIAGNOSIS — S39.012A BACK STRAIN, INITIAL ENCOUNTER: Primary | ICD-10-CM

## 2021-06-29 PROCEDURE — 99203 OFFICE O/P NEW LOW 30 MIN: CPT | Performed by: PHYSICIAN ASSISTANT

## 2021-06-29 RX ORDER — CYCLOBENZAPRINE HCL 5 MG
TABLET ORAL
Qty: 12 TABLET | Refills: 0 | Status: SHIPPED | OUTPATIENT
Start: 2021-06-29

## 2021-06-29 RX ORDER — DICLOFENAC SODIUM 75 MG/1
75 TABLET, DELAYED RELEASE ORAL 2 TIMES DAILY
Qty: 20 TABLET | Refills: 0 | Status: SHIPPED | OUTPATIENT
Start: 2021-06-29

## 2021-06-29 ASSESSMENT — MIFFLIN-ST. JEOR: SCORE: 2259.46

## 2021-06-29 NOTE — PROGRESS NOTES
Assessment & Plan     1. Back strain, initial encounter  Exam and patient history of recent stretch into his car consistent with lumbar strain. He does not have any red flag symptoms for imagining / MRI. Considered fracture, herniated disc, radiculopathy,  pyelonephritis, cauda equina etc.   Advised medications as below. Discussed that flexeril can be sedating. Continue with ice and heat and light stretching.  Follow-up with ortho in one week if symptoms not improving as expected.   - cyclobenzaprine (FLEXERIL) 5 MG tablet; Take 1-2 tablets three times per day as needed for muscle spasms.  Dispense: 12 tablet; Refill: 0  - diclofenac (VOLTAREN) 75 MG EC tablet; Take 1 tablet (75 mg) by mouth 2 times daily  Dispense: 20 tablet; Refill: 0  - Orthopedic  Referral; Future      Return in about 5 days (around 7/4/2021), or if symptoms worsen or fail to improve, for ortho.      Marie Alvarenga PA-C  [facility] [place]    CHIEF COMPLAINT:   Chief Complaint   Patient presents with     Urgent Care     Back Pain     h/o scoliosis but pulled something in his back. Has put on 20#'s since covid. Was taking something out of car and having bad back pain.      Parish Littlejohn is a 31 year old male who presents to clinic today for evaluation of back pain. Patient was reaching into his car yesterday and felt a pull in his lower back. Reports immediate pain which has continued today. Feels best if he is sitting upright or standing. Worse with twisting or moving. Hx of back fracture as a child, which worsened during his growth spurt.   Of note, currently being treated for strep throat.   Patient denies fever, chills, fatigue, weight loss, fecal or urinary incontinence, lower extremity numbness or tingling, or lower extremity weakness.        No past medical history on file.  No past surgical history on file.  Social History     Tobacco Use     Smoking status: Never Smoker     Smokeless  "tobacco: Never Used   Substance Use Topics     Alcohol use: Not on file     Current Outpatient Medications   Medication     cyclobenzaprine (FLEXERIL) 5 MG tablet     diclofenac (VOLTAREN) 75 MG EC tablet     No current facility-administered medications for this visit.      Allergies   Allergen Reactions     Penicillins      All cillins       10 point ROS of systems were all negative except for pertinent positives noted in my HPI.      Exam:   /70   Pulse 87   Temp 98.7  F (37.1  C) (Oral)   Resp 16   Ht 1.88 m (6' 2\")   Wt 122.5 kg (270 lb)   SpO2 98%   BMI 34.67 kg/m    Constitutional: healthy, alert and no distress  Head: Normocephalic, atraumatic.  Neck: neck is supple, no cervical lymphadenopathy or nuchal rigidity  Cardiovascular: RRR  Respiratory: CTA bilaterally, no rhonchi or rales  BACK Midline and paraspinal muscle tenderness in lumbar region. NO rash, redness or swelling noted. Sensation and strength intact.   Skin: no rashes  Neurologic: Speech clear, gait normal. Moves all extremities.          "

## 2021-06-29 NOTE — PATIENT INSTRUCTIONS
Flexeril is the muscle relaxer -- this can be sedating  Diclofenac for anti inflammatory -- DO NOT take ibuprofen / advil / aleve etc. OK to take Tylenol (acetominophen)   Stretching, ice/heat.       Patient Education     Back Sprain or Strain     Injury to the muscles (strain) or ligaments (sprain) around the spine can be troubling. Injury may occur after a sudden forceful twisting or bending such as in a car accident, after a simple awkward movement, or after lifting something heavy with poor body positioning. In any case, muscle spasm is often present and adds to the pain.  Thankfully, most people feel better in 1 to 2 weeks. Most of the rest feel better in 1 to 2 months. Most people can remain active. Unless you had a forceful or traumatic physical injury such as a car accident or fall, X-rays may not be done for the first assessment of a back sprain or strain. If pain continues and doesn't respond to medical treatment, your healthcare provider may then do X-rays and other tests.  Home care  These guidelines will help you care for your injury at home:    When in bed, try to find a comfortable position. A firm mattress is best. Try lying flat on your back with pillows under your knees. You can also try lying on your side with your knees bent up toward your chest and a pillow between your knees.    Don't sit for long periods. Try not to take long car rides or take other trips that have you sitting for a long time. This puts more stress on the lower back than standing or walking.    During the first 24 to 72 hours after an injury or flare-up, put an ice pack on the painful area for 20 minutes. Then remove it for 20 minutes. Do this for 60 to 90 minutes, or several times a day. This will reduce swelling and pain. Always wrap the ice pack in a thin towel or plastic to protect your skin.    You can start with ice, then switch to heat. Heat from a hot shower, hot bath, or heating pad reduces pain and works well for  muscle spasms. Put heat on the painful area for 20 minutes, then remove for 20 minutes. Do this for 60 to 90 minutes, or several times a day. Don't use a heating pad while sleeping. It can burn the skin.    You can alternate the ice and heat. Talk with your healthcare provider to find out the best treatment or therapy for your back pain.    Therapeutic massage can help relax the back muscles without stretching them.    Be aware of safe lifting methods. Don't lift anything over 15 pounds until all of the pain is gone.  Medicines  Talk with your healthcare provider before using medicines, especially if you have other health problems or are taking other medicines.    You may use over-the-counter medicines such as acetaminophen, ibuprofen, or naproxen to control pain, unless another pain medicine was prescribed. Talk with your healthcare provider before taking any medicines if you have a chronic condition such as diabetes, liver or kidney disease, stomach ulcers, or digestive bleeding, or are taking blood-thinner medicines.    Be careful if you are given prescription medicines, opioids, or medicine for muscle spasm. They can cause drowsiness, and affect your coordination, reflexes, and judgment. Don't drive or operate heavy machinery when taking these types of medicines. Only take pain medicine as prescribed by your healthcare provider.  Follow-up care  Follow up with your healthcare provider, or as advised. You may need physical therapy or more tests if your symptoms get worse.  If you had X-rays, your healthcare provider may be checking for any broken bones, breaks, or fractures. Bruises and sprains can sometimes hurt as much as a fracture. These injuries can take time to heal fully. If your symptoms don t get better or they get worse, talk with your healthcare provider. You may need a repeat X-ray or other tests.  Call 911  Call 911 if any of the following occur:    Trouble breathing    Confused    Very drowsy or  trouble awakening    Fainting or loss of consciousness    Rapid or very slow heart rate    Loss of bowel or bladder control  When to seek medical advice  Call your healthcare provider right away if any of the following occur:    Pain gets worse or spreads to your arms or legs    Weakness or numbness in one or both arms or legs    Numbness in the groin or genital area  Mina last reviewed this educational content on 11/1/2019 2000-2021 [employer]. All rights reserved. This information is not intended as a substitute for professional medical care. Always follow your healthcare professional's instructions.

## 2021-07-04 NOTE — PATIENT INSTRUCTIONS - HE
Patient Instructions by Emiliano Benjamin PA-C at 6/25/2021 12:10 PM     Author: Emiliano Benjamin PA-C Service: -- Author Type: Physician Assistant    Filed: 6/25/2021 12:34 PM Encounter Date: 6/25/2021 Status: Addendum    : Emiliano Benjamin PA-C (Physician Assistant)    Related Notes: Original Note by Emiliano Benjamin PA-C (Physician Assistant) filed at 6/25/2021 12:34 PM       Suggested increased rest increased fluids and bedside humidification  Over-the-counter Tylenol for comfort.  Follow packaging directions  Over-the-counter throat lozenges with benzocaine such as Cepacol may be used if indicated and is not a choking hazard based on age.  Follow packaging directions.  Do not overuse the benzocaine as it will dry the throat and make it uncomfortable.  Noncontagious after 24 hours on the antibiotic.  Change toothbrush out after 48 hours to avoid reinfecting the mouth.  Follow-up after completion of the antibiotic if new symptoms or concerns arise.        Self-Care for Sore Throats  Sore throats happen for many reasons, such as colds, allergies, and infections caused by viruses or bacteria. In any case, your throat becomes red and sore. Your goal for self-care is to reduce your discomfort while giving your throat a chance to heal.    Moisten and soothe your throat  Tips include the following:    Try a sip of water first thing after waking up.    Keep your throat moist by drinking 6 or more glasses of clear liquids every day.    Run a cool-air humidifier in your room overnight.    Avoid cigarette smoke.     Suck on throat lozenges, cough drops, hard candy, ice chips, or frozen fruit-juice bars. Use the sugar-free versions if your diet or medical condition requires them.  Gargle to ease irritation  Gargling every hour or 2 can ease irritation. Try gargling with 1 of these solutions:    1/4 teaspoon of salt in 1/2 cup of warm water    An over-the-counter anesthetic gargle  Use medicine for more relief  Over-the-counter  medicine can reduce sore throat symptoms. Ask your pharmacist if you have questions about which medicine to use:    Ease pain with anesthetic sprays. Aspirin or an aspirin substitute also helps. Remember, never give aspirin to anyone 18 or younger, or if you are already taking blood thinners.     For sore throats caused by allergies, try antihistamines to block the allergic reaction.    Remember: unless a sore throat is caused by a bacterial infection, antibiotics wont help you.  Prevent future sore throats  Prevention tips include the following:    Stop smoking or reduce contact with secondhand smoke. Smoke irritates the tender throat lining.    Limit contact with pets and with allergy-causing substances, such as pollen and mold.    When youre around someone with a sore throat or cold, wash your hands often to keep viruses or bacteria from spreading.    Dont strain your vocal cords.  Call your healthcare provider  Contact your healthcare provider if you have:    A temperature over 101 F (38.3 C)    White spots on the throat    Great difficulty swallowing    Trouble breathing    A skin rash    Recent exposure to someone else with strep bacteria    Severe hoarseness and swollen glands in the neck or jaw   Date Last Reviewed: 8/1/2016 2000-2016 [employer]. [address] [place] [address]. All rights reserved. This information is not intended as a substitute for professional medical care. Always follow your healthcare professional's instructions.        Patient Education     Middle Ear Infection (Adult)  You have an infection of the middle ear, the space behind the eardrum. This is also called acute otitis media (AOM). Sometimes it is caused by the common cold. This is because congestion can block the internal passage (eustachian tube) that drains fluid from the middle ear. When the middle ear fills with fluid, bacteria can grow there and cause an infection. Oral antibiotics are used to  treat this illness, not ear drops. Symptoms usually start to improve within 1 to 2 days of treatment.    Home care  The following are general care guidelines:    Finish all of the antibiotic medicine given, even though you may feel better after the first few days.    You may use over-the-counter medicine, such as acetaminophen or ibuprofen, to control pain and fever, unless something else was prescribed. If you have chronic liver or kidney disease or have ever had a stomach ulcer or gastrointestinal bleeding, talk with your healthcare provider before using these medicines. Do not give aspirin to anyone under 18 years of age who has a fever. It may cause severe illness or death.  Follow-up care  Follow up with your healthcare provider, or as advised, in 2 weeks if all symptoms have not gotten better, or if hearing doesn't go back to normal within 1 month.  When to seek medical advice  Call your healthcare provider right away if any of these occur:    Ear pain gets worse or does not improve after 3 days of treatment    Unusual drowsiness or confusion    Neck pain, stiff neck, or headache    Fluid or blood draining from the ear canal    Fever of 100.4 F (38 C) or as advised     Seizure  Date Last Reviewed: 6/1/2016 2000-2017 [employer]. [address] [place] [address], [place] [address]. All rights reserved. This information is not intended as a substitute for professional medical care. Always follow your healthcare professional's instructions.

## 2021-07-06 VITALS
RESPIRATION RATE: 16 BRPM | OXYGEN SATURATION: 97 % | HEART RATE: 69 BPM | DIASTOLIC BLOOD PRESSURE: 90 MMHG | TEMPERATURE: 98.9 F | WEIGHT: 274 LBS | SYSTOLIC BLOOD PRESSURE: 133 MMHG

## 2021-07-07 NOTE — PROGRESS NOTES
Progress Notes by Emiliano Benjamin PA-C at 6/25/2021 12:10 PM     Author: Emiliano Benjamin PA-C Service: -- Author Type: Physician Assistant    Filed: 6/25/2021  1:24 PM Encounter Date: 6/25/2021 Status: Signed    : Emiliano Benjamin PA-C (Physician Assistant)       Chief Complaint   Patient presents with   ? Sore Throat     also woke up with ear ache          Clinical Decision Making:      At the end of the encounter, I discussed results, diagnosis, medications. Discussed red flags for immediate return to clinic/ER, as well as indications for follow up if no improvement. Patient understood and agreed to plan. Patient was stable for discharge.    1. Sore throat  Rapid Strep A Screen-Throat    azithromycin (ZITHROMAX) 500 MG tablet    Group A Strep PCR Throat Swab   2. Other non-recurrent acute nonsuppurative otitis media of left ear  azithromycin (ZITHROMAX) 500 MG tablet         Patient Instructions     Suggested increased rest increased fluids and bedside humidification  Over-the-counter Tylenol for comfort.  Follow packaging directions  Over-the-counter throat lozenges with benzocaine such as Cepacol may be used if indicated and is not a choking hazard based on age.  Follow packaging directions.  Do not overuse the benzocaine as it will dry the throat and make it uncomfortable.  Noncontagious after 24 hours on the antibiotic.  Change toothbrush out after 48 hours to avoid reinfecting the mouth.  Follow-up after completion of the antibiotic if new symptoms or concerns arise.        Self-Care for Sore Throats  Sore throats happen for many reasons, such as colds, allergies, and infections caused by viruses or bacteria. In any case, your throat becomes red and sore. Your goal for self-care is to reduce your discomfort while giving your throat a chance to heal.    Moisten and soothe your throat  Tips include the following:    Try a sip of water first thing after waking up.    Keep your throat moist by drinking 6 or more  glasses of clear liquids every day.    Run a cool-air humidifier in your room overnight.    Avoid cigarette smoke.     Suck on throat lozenges, cough drops, hard candy, ice chips, or frozen fruit-juice bars. Use the sugar-free versions if your diet or medical condition requires them.  Gargle to ease irritation  Gargling every hour or 2 can ease irritation. Try gargling with 1 of these solutions:    1/4 teaspoon of salt in 1/2 cup of warm water    An over-the-counter anesthetic gargle  Use medicine for more relief  Over-the-counter medicine can reduce sore throat symptoms. Ask your pharmacist if you have questions about which medicine to use:    Ease pain with anesthetic sprays. Aspirin or an aspirin substitute also helps. Remember, never give aspirin to anyone 18 or younger, or if you are already taking blood thinners.     For sore throats caused by allergies, try antihistamines to block the allergic reaction.    Remember: unless a sore throat is caused by a bacterial infection, antibiotics wont help you.  Prevent future sore throats  Prevention tips include the following:    Stop smoking or reduce contact with secondhand smoke. Smoke irritates the tender throat lining.    Limit contact with pets and with allergy-causing substances, such as pollen and mold.    When youre around someone with a sore throat or cold, wash your hands often to keep viruses or bacteria from spreading.    Dont strain your vocal cords.  Call your healthcare provider  Contact your healthcare provider if you have:    A temperature over 101 F (38.3 C)    White spots on the throat    Great difficulty swallowing    Trouble breathing    A skin rash    Recent exposure to someone else with strep bacteria    Severe hoarseness and swollen glands in the neck or jaw   Date Last Reviewed: 8/1/2016 2000-2016 [employer]. [address] [place] [address], [place] [address]. All rights reserved. This information is not intended as a substitute for  professional medical care. Always follow your healthcare professional's instructions.        Patient Education     Middle Ear Infection (Adult)  You have an infection of the middle ear, the space behind the eardrum. This is also called acute otitis media (AOM). Sometimes it is caused by the common cold. This is because congestion can block the internal passage (eustachian tube) that drains fluid from the middle ear. When the middle ear fills with fluid, bacteria can grow there and cause an infection. Oral antibiotics are used to treat this illness, not ear drops. Symptoms usually start to improve within 1 to 2 days of treatment.    Home care  The following are general care guidelines:    Finish all of the antibiotic medicine given, even though you may feel better after the first few days.    You may use over-the-counter medicine, such as acetaminophen or ibuprofen, to control pain and fever, unless something else was prescribed. If you have chronic liver or kidney disease or have ever had a stomach ulcer or gastrointestinal bleeding, talk with your healthcare provider before using these medicines. Do not give aspirin to anyone under 18 years of age who has a fever. It may cause severe illness or death.  Follow-up care  Follow up with your healthcare provider, or as advised, in 2 weeks if all symptoms have not gotten better, or if hearing doesn't go back to normal within 1 month.  When to seek medical advice  Call your healthcare provider right away if any of these occur:    Ear pain gets worse or does not improve after 3 days of treatment    Unusual drowsiness or confusion    Neck pain, stiff neck, or headache    Fluid or blood draining from the ear canal    Fever of 100.4 F (38 C) or as advised     Seizure  Date Last Reviewed: 6/1/2016 2000-2017 [employer]. [address] [place] [address], [place] [address]. All rights reserved. This information is not intended as a substitute for professional medical  care. Always follow your healthcare professional's instructions.                HPI:  Eron Jacinto is a 31 y.o. male who presents today for evaluation of possible strep throat and left-sided ear pain.  Patient was concerned that he had strep throat since he had sore throat and odynophagia.  He has not had fever chills or night sweats but he has had left-sided ear pain.  No hearing loss otorrhea or balance deficits to report.  Patient does share that he has frequent strep throat and otitis media.  He works as a manager of a [employer].  He has no known sick contacts for strep throat or for COVID-19.  Patient denies COVID-19 screening and denies shortness of breath cough headache nausea vomiting or diarrhea loss of taste or smell.    History obtained from chart review and the patient.    Problem List:  There are no relevant problems documented for this patient.      History reviewed. No pertinent past medical history.    Social History     Tobacco Use   ? Smoking status: Former Smoker   ? Smokeless tobacco: Never Used   Substance Use Topics   ? Alcohol use: Not on file       Review of Systems  As above in HPI, otherwise balance of Review of Systems are negative.    Vitals:    06/25/21 1210   BP: 133/90   Patient Site: Left Arm   Patient Position: Sitting   Cuff Size: Adult Large   Pulse: 69   Resp: 16   Temp: 98.9  F (37.2  C)   TempSrc: Oral   SpO2: 97%   Weight: (!) 274 lb (124.3 kg)       Physical Exam    General: Patient is resting comfortably no acute distress is afebrile  HEENT: Head is normocephalic atraumatic   eyes are PERRL EOMI sclera anicteric   TMs are with erythema on the left side, without effusion TM on the right is clear  Throat is without pharyngeal wall erythema and no exudate  No cervical lymphadenopathy present  LUNGS: Clear to auscultation bilaterally  HEART: Regular rate and rhythm  Skin: Without rash non-diaphoretic    Lab:  Results for orders placed or performed in visit on 06/25/21   Rapid  Strep A Screen-Throat    Specimen: Throat   Result Value Ref Range    Rapid Strep A Antigen No Group A Strep detected, presumptive negative No Group A Strep detected, presumptive negative

## (undated) DEVICE — MARKER SKIN DOUBLE TIP W/FLEXI-RULER W/LABELS

## (undated) DEVICE — BASIN SET MINOR DISP

## (undated) DEVICE — SUCTION TIP YANKAUER W/O VENT K86

## (undated) DEVICE — ANTIFOG SOLUTION W/FOAM PAD CF-1001

## (undated) DEVICE — CATH FOLEY 8FR 3ML SILICONE LUBRI-SIL 175808

## (undated) DEVICE — PACK SET-UP STD 9102

## (undated) DEVICE — ESU ELEC BLADE 2.75" COATED/INSULATED E1455

## (undated) DEVICE — SYR 10ML FINGER CONTROL W/O NDL 309695

## (undated) DEVICE — GLOVE PROTEXIS W/NEU-THERA 6.5  2D73TE65

## (undated) DEVICE — GOWN XLG DISP 9545

## (undated) DEVICE — TUBING SUCTION 10'X3/16" N510

## (undated) DEVICE — DRAPE SHEET HALF 40X60" 9358

## (undated) DEVICE — SYR EAR BULB 3OZ 0035830

## (undated) DEVICE — SOL WATER IRRIG 1000ML BOTTLE 07139-09

## (undated) DEVICE — ESU PENCIL W/HOLSTER

## (undated) DEVICE — NDL 25GA 1.5" 305127

## (undated) DEVICE — SUCTION CANISTER MEDIVAC LINER 1500ML W/LID 65651-515

## (undated) DEVICE — ESU SUCTION CAUTERY 10FR FOOT CONTROL E2505-10FR

## (undated) DEVICE — SPONGE TONSIL W/STRING MED

## (undated) RX ORDER — FENTANYL CITRATE 50 UG/ML
INJECTION, SOLUTION INTRAMUSCULAR; INTRAVENOUS
Status: DISPENSED
Start: 2019-09-09

## (undated) RX ORDER — DEXAMETHASONE SODIUM PHOSPHATE 4 MG/ML
INJECTION, SOLUTION INTRA-ARTICULAR; INTRALESIONAL; INTRAMUSCULAR; INTRAVENOUS; SOFT TISSUE
Status: DISPENSED
Start: 2019-09-09

## (undated) RX ORDER — CLINDAMYCIN PHOSPHATE 150 MG/ML
INJECTION, SOLUTION INTRAVENOUS
Status: DISPENSED
Start: 2019-09-09

## (undated) RX ORDER — ACETAMINOPHEN 325 MG/1
TABLET ORAL
Status: DISPENSED
Start: 2019-09-09

## (undated) RX ORDER — PROPOFOL 10 MG/ML
INJECTION, EMULSION INTRAVENOUS
Status: DISPENSED
Start: 2019-09-09

## (undated) RX ORDER — GLYCOPYRROLATE 0.2 MG/ML
INJECTION INTRAMUSCULAR; INTRAVENOUS
Status: DISPENSED
Start: 2019-09-09

## (undated) RX ORDER — OXYCODONE HYDROCHLORIDE 5 MG/1
TABLET ORAL
Status: DISPENSED
Start: 2019-09-09

## (undated) RX ORDER — ONDANSETRON 2 MG/ML
INJECTION INTRAMUSCULAR; INTRAVENOUS
Status: DISPENSED
Start: 2019-09-09

## (undated) RX ORDER — GABAPENTIN 300 MG/1
CAPSULE ORAL
Status: DISPENSED
Start: 2019-09-09

## (undated) RX ORDER — LIDOCAINE HYDROCHLORIDE AND EPINEPHRINE 10; 10 MG/ML; UG/ML
INJECTION, SOLUTION INFILTRATION; PERINEURAL
Status: DISPENSED
Start: 2019-09-09

## (undated) RX ORDER — OXYCODONE HCL 5 MG/5 ML
SOLUTION, ORAL ORAL
Status: DISPENSED
Start: 2019-09-09